# Patient Record
Sex: MALE | Race: WHITE | NOT HISPANIC OR LATINO | URBAN - METROPOLITAN AREA
[De-identification: names, ages, dates, MRNs, and addresses within clinical notes are randomized per-mention and may not be internally consistent; named-entity substitution may affect disease eponyms.]

---

## 2018-12-10 NOTE — PATIENT DISCUSSION
Multifocal IOLs have an added po risk of increased glare or halos and there is no guarantee that the pt will not need glasses po or see 20/20.

## 2018-12-20 ENCOUNTER — IMPORTED ENCOUNTER (OUTPATIENT)
Dept: URBAN - METROPOLITAN AREA CLINIC 50 | Facility: CLINIC | Age: 58
End: 2018-12-20

## 2018-12-20 NOTE — PATIENT DISCUSSION
"""5 line raster of macula and ON shows no elevation. Follow nevus for change in size/shape.  RTC ""

## 2019-06-05 ENCOUNTER — IMPORTED ENCOUNTER (OUTPATIENT)
Dept: URBAN - METROPOLITAN AREA CLINIC 50 | Facility: CLINIC | Age: 59
End: 2019-06-05

## 2019-08-27 NOTE — PATIENT DISCUSSION
OS 1st/ Symfony OS Alva/ Custom OS Alva/ B + OS Alva/ B OS Alva/ TMF  Alva/ Custom OD Alva/ B + OD Alva/ B OS Alva.

## 2019-08-27 NOTE — PATIENT DISCUSSION
Pt paid for corneal topography on 1/15/19 $398.00. If pt elects Advanced or Custom will need refund. west.

## 2019-09-16 NOTE — PATIENT DISCUSSION
Cataract surgery has been performed in the first eye and activities of daily living are still impaired. The patient would like to proceed with cataract surgery in the second eye as scheduled. The patient elects Custom Vision OD, goal of Alva.

## 2019-12-03 NOTE — PATIENT DISCUSSION
patient will trial monovision contacts again after yag to see if distance vision improves with monovision.  Patient is interested in monovision with distance goal OS.  Patient informed he must be happy and confident in distance vision with monovision prior to LVC OD.

## 2019-12-03 NOTE — PATIENT DISCUSSION
patient will trial monovision contacts again after yag to see if distance vision improves with monovision.  Patient is interested in monovision with near goal OD.  Patient informed he must be happy and confident in distance vision with monovision prior to LVC OD.

## 2019-12-04 NOTE — PROCEDURE NOTE: SURGICAL
Prior to commencing surgery, Dr. Luis Chahal confirmed patient identification, surgical procedure, site and side. Following topical proparacaine anesthesia, the patient was positioned at the YAG laser, a contact lens was coupled to the cornea of the right eye with methylcellulose and an axial posterior capsulotomy was performed without complication using 2.7 Mj x 29. Attention was turned to the left eye and a contact lens was coupled to the cornea of the left eye with methylcellulose and an axial posterior capsulotomy was performed without complication using 2.8 Mj x 30. Excess methylcellulose was washed from both eyes, one drop of Alphagan was instilled in each eye and the patient returned to the holding area having tolerated the procedure well and without complication. <br />XLS:[id number]<KM /><br />

## 2019-12-11 NOTE — PATIENT DISCUSSION
patient will trial monovision contacts again to obtain Near goal for possible LVC OD.  Patient is interested in monovision with near goal OD.  Patient informed he must be happy and confident in distance vision with monovision prior to LVC OD.

## 2019-12-11 NOTE — PATIENT DISCUSSION
patient will trial monovision contacts again when mr stable to see if distance vision improves with monovision.  Patient is interested in monovision with distance goal OS.  Patient informed he must be happy and confident in distance vision with monovision prior to LVC OD.

## 2019-12-18 ENCOUNTER — IMPORTED ENCOUNTER (OUTPATIENT)
Dept: URBAN - METROPOLITAN AREA CLINIC 50 | Facility: CLINIC | Age: 59
End: 2019-12-18

## 2020-01-07 NOTE — PATIENT DISCUSSION
Order Cl trials for monovision for patient to  without appointment OS.   Patient to trial distance vision without contact in os vs with contact lens in OS.

## 2020-01-29 NOTE — PATIENT DISCUSSION
Chart to [facility] for HOSP [facility] Review with goal of -1.75/-2.00 OD.

## 2020-01-29 NOTE — PATIENT DISCUSSION
Patient informed he will need to discontinue contacts prior to HOSP [facility] OD.  Patient instructed to discontinue  contacts today.

## 2020-01-29 NOTE — PATIENT DISCUSSION
Patient is happy with distance vision in OS at this time.  Patient confident with vision at distance and near with monovision cl trial.

## 2020-02-13 NOTE — PATIENT DISCUSSION
Patient informed he will need to discontinue contacts prior to HOSP [facility] OD.  Patient instructed to discontinue  contacts today.

## 2020-02-13 NOTE — PATIENT DISCUSSION
Chart to [facility] for HOSP [facility]SIQU[employer]ATRIA [employer]ENSE DE [facility] Review with goal of -1.75/-2.00 OD.

## 2020-02-14 NOTE — PATIENT DISCUSSION
Patient informed he will need to discontinue contacts prior to HOSP [facility] DE [facility] OD.  Patient instructed to discontinue  contacts today.

## 2020-02-14 NOTE — PATIENT DISCUSSION
Chart to [facility] for HOSP [facility]SIQU[employer]ATRIA FORENSE DE [facility] Review with goal of -1.75/-2.00 OD.

## 2020-02-15 NOTE — PATIENT DISCUSSION
Chart to [facility] for HOSP PSIQU[employer]RIA FORENSE DE [facility] Review with goal of -1.75/-2.00 OD.

## 2020-02-15 NOTE — PATIENT DISCUSSION
Good postoperative appearance.  No DLK flap look perfect today.  BCL out today in office.  resume normal PO gtts schedule.  ed may not need to see [facility] later this week since BCL out today with KMS.   could go ahead with 3-4 week PO as normal.  KMS will relay to [facility].

## 2020-02-15 NOTE — PATIENT DISCUSSION
Patient informed he will need to discontinue contacts prior to HOSP [facility] OD.  Patient instructed to discontinue  contacts today.

## 2021-04-17 ASSESSMENT — VISUAL ACUITY
OD_CC: 20/40+1
OD_OTHER: 20/30. 20/50.
OD_CC: 20/30
OS_PH: 20/20
OD_BAT: 20/50
OD_BAT: 20/30
OS_CC: J1
OD_OTHER: 20/50. 20/70.
OS_OTHER: 20/30. 20/40.
OD_OTHER: 20/20-2.
OS_CC: 20/20
OD_CC: 20/30-1
OS_OTHER: 20/25.
OS_CC: J1+@ 16 IN
OS_BAT: 20/30
OD_CC: J1
OS_CC: 20/40
OS_OTHER: 20/30. 20/40-.
OS_CC: 20/25-1
OD_PH: 20/25+1
OS_BAT: 20/30
OD_PH: 20/25+2
OD_CC: J1+@ 16 IN

## 2021-04-17 ASSESSMENT — TONOMETRY
OS_IOP_MMHG: 12
OS_IOP_MMHG: 14
OD_IOP_MMHG: 15
OS_IOP_MMHG: 13
OD_IOP_MMHG: 14
OD_IOP_MMHG: 12

## 2022-03-17 NOTE — PATIENT DISCUSSION
Patient understands condition, prognosis and need for follow up care. What Type Of Note Output Would You Prefer (Optional)?: Bullet Format How Severe Is Your Acne?: moderate Is This A New Presentation, Or A Follow-Up?: Follow Up Acne

## 2023-06-09 NOTE — PATIENT DISCUSSION
Continued Stay / Assessment/Plan of Care Note       Active Substitute Decision Maker (SDM)    There are no active Substitute Decision Maker (SDM) on file.           Progress note:    0741  [facility] accepted patient.     See / flowsheets for other objective data.    Disposition Recommendations:  Preliminary discharge destination:    / recommendation for discharge: Home    Discharge Plan/Needs:     Continued Care and Services - Admitted Since 6/4/2023    Coordination has not been started for this encounter.     Selected Continued Care - Episodes Includes continued care and service providers with selected services from the active episodes listed below    Care Management Episode start date: 6/6/2023 (Paused)   There are no active outsourced providers for this episode.                   Devices/ Equipment that need to be arranged for discharge:     Accepted   Pending insurance authorization   Others:    Anticipated date of DME availability:     Prior To Hospitalization:    Living Situation: Alone and residing at House    .  Support Systems: Children   Home Devices/Equipment: None ,   ,    ,      Mobility Assist Devices: Wheelchair, Front-wheeled walker, Cane   Type of Service Prior to Hospitalization: None ,   ,   ,   ,    ,       Patient/Family discharge goal (s):  Home     Resources provided:           Therapy Recommendations for Discharge:   PT:    therapy 1-3 times per week (Pt's children take turns so pt has 24h assist at home.)  OT:     does not require ongoing therapy  SLP:  No value filed.    Mobility Equipment Recommended for Discharge: Pt owns a hospital bed, a RW, a w/c, and a lift chair.      Barriers to Discharge  Identified Barriers to Discharge/Transition Planning:                   Discussed condition and exacerbating conditions/situations (e.g., dry/arid environments, overhead fans, air conditioners, side effect of medications).